# Patient Record
Sex: FEMALE | Race: WHITE | NOT HISPANIC OR LATINO | Employment: STUDENT | URBAN - METROPOLITAN AREA
[De-identification: names, ages, dates, MRNs, and addresses within clinical notes are randomized per-mention and may not be internally consistent; named-entity substitution may affect disease eponyms.]

---

## 2019-03-05 ENCOUNTER — TELEPHONE (OUTPATIENT)
Dept: GASTROENTEROLOGY | Facility: CLINIC | Age: 19
End: 2019-03-05

## 2019-03-05 ENCOUNTER — OFFICE VISIT (OUTPATIENT)
Dept: GASTROENTEROLOGY | Facility: CLINIC | Age: 19
End: 2019-03-05
Payer: COMMERCIAL

## 2019-03-05 VITALS
WEIGHT: 154 LBS | BODY MASS INDEX: 24.17 KG/M2 | SYSTOLIC BLOOD PRESSURE: 115 MMHG | DIASTOLIC BLOOD PRESSURE: 64 MMHG | HEART RATE: 80 BPM | HEIGHT: 67 IN

## 2019-03-05 DIAGNOSIS — K58.2 IRRITABLE BOWEL SYNDROME WITH CONSTIPATION AND DIARRHEA: ICD-10-CM

## 2019-03-05 DIAGNOSIS — R10.30 LOWER ABDOMINAL PAIN: ICD-10-CM

## 2019-03-05 DIAGNOSIS — Z12.11 COLON CANCER SCREENING: ICD-10-CM

## 2019-03-05 DIAGNOSIS — R19.4 CHANGE IN BOWEL HABIT: Primary | ICD-10-CM

## 2019-03-05 DIAGNOSIS — R19.7 DIARRHEA, UNSPECIFIED TYPE: ICD-10-CM

## 2019-03-05 PROCEDURE — 99214 OFFICE O/P EST MOD 30 MIN: CPT | Performed by: NURSE PRACTITIONER

## 2019-03-05 RX ORDER — LEVOCETIRIZINE DIHYDROCHLORIDE 5 MG/1
5 TABLET, FILM COATED ORAL EVERY EVENING
COMMUNITY

## 2019-03-05 RX ORDER — WHEAT DEXTRIN 3 G/3.8 G
POWDER (GRAM) ORAL DAILY
Refills: 0
Start: 2019-03-05

## 2019-03-05 NOTE — PROGRESS NOTES
[id number] - Outpatient Follow-up Note  Justyna Gross 20 y o  female MRN: [id number]  Encounter: [id number]    ASSESSMENT AND PLAN:      1  Change in bowel habit  Cannot exclude IBD, microscopic colitis, infectious colitis  May represent IBS with alternating diarrhea and constipation  Will obtain labs, stool studies and she will have a colonoscopy-[facility]  She will start a daily fiber supplement  She is reluctant to try hyoscyamine for her abdominal cramping as she had adverse effects with Bentyl  She will consider following completion of testing when she returns for follow-up  Probably would not use Questran as she has intermittent bouts of constipation/days without stool as well  - CBC and Platelet; Future  - Comprehensive metabolic panel; Future  - Sedimentation rate, automated; Future  - Celiac Disease Panel; Future  - TSH + Free T4; Future  - Sod Picosulfate-Mag Ox-Cit Acd (CLENPIQ) 10-3 5-12 MG-GM -GM/160ML SOLN; Use as directed  Dispense: 2 Bottle; Refill: 0    2  Diarrhea, unspecified type  Cannot exclude infectious etiology  May represent IBD, microscopic colitis or IBS  This may also represent post cholecystectomy chronic diarrhea  See change in bowel habits for considerations and plan      - H  pylori antigen, stool; Future  - Stool Enteric Bacterial Panel by PCR; Future  - White Blood Cells, Stool by Gram Stain; Future  - Lactoferrin, fecal, quantitative; Future  - Fecal fat, qualitative; Future  - FECAL LEUKOCYTES; Future  - Wheat Dextrin (BENEFIBER) POWD; Take by mouth daily; Refill: 0    3  Lower abdominal pain  Cannot exclude IBD or microscopic colitis but may represent IBS  See change in bowel habits for considerations and plan  4  Irritable bowel syndrome with constipation and diarrhea  Previously considered as the etiology of the patient's ongoing symptoms for the past 1 year  See change in bowel habits for considerations and plan    If all other possibilities and testing is negative, consider amitriptyline  5  Colon cancer screening  Average risk  Begin routine screenings at age 47  Due to her current symptoms she will have a colonoscopy now  Followup Appointment[de-identified]  2 months after she returns from her current semester at college   ______________________________________________________________________    Chief Complaint   Patient presents with    Abdominal Pain     off and on 1 year    Diarrhea     HPI:  This is a 48-year-old female patient who is accompanied to the office today with her father  She has had a 1 year history of diarrhea described as 6-10 watery explosive stools per day which come with urgency  The diarrhea occurs randomly and with no specific food triggers  The diarrhea started following her laparoscopic cholecystectomy in March 2018  She also experiences periods of a couple of days where she has no bowel movement, feels bloated and nauseated with fecal urgency  She describes this as constipation  Denies fecal incontinence, nocturnal stools  Denies melena, hematochezia but does note some mucus in her stools  There is lower abdominal cramping prior to bowel movements which is sometimes relieved following defecation  She reports fatigue, no muscle or joint aches  Denies nausea/vomiting, fever/chills  She has rare heartburn only after food indiscretions  Denies dysphagia, odynophagia, early satiety  No recent antibiotics, travel, sick contacts, although she does reside in a dorm environment at college  Her appetite is normal   Her weight has been stable  The patient is very tearful when describing her current health concerns      Historical Information   Past Medical History:   Diagnosis Date    Biliary dyskinesia     Migraines     Seasonal allergies      Past Surgical History:   Procedure Laterality Date    LAPAROSCOPIC CHOLECYSTECTOMY  03/2018    WISDOM TOOTH EXTRACTION       Social History     Substance and Sexual Activity   Alcohol Use Never    Frequency: Never     Social History     Substance and Sexual Activity   Drug Use Never     Social History     Tobacco Use   Smoking Status Never Smoker   Smokeless Tobacco Never Used     Family History   Problem Relation Age of Onset    No Known Problems Mother     Hypertension Father     Colon cancer Neg Hx     Colon polyps Neg Hx          Current Outpatient Medications:     levocetirizine (XYZAL) 5 MG tablet    Sod Picosulfate-Mag Ox-Cit Acd (CLENPIQ) 10-3 5-12 MG-GM -GM/160ML SOLN    Wheat Dextrin (BENEFIBER) POWD  Allergies   Allergen Reactions    Bentyl [Dicyclomine] Nausea Only       10 Point REVIEW OF SYSTEMS IS OTHERWISE NEGATIVE EXCEPT fatigue, decreased appetite, heartburn, change in bowel habits, difficulty sleeping and loss of appetite  Additional ROS per HPI  PHYSICAL EXAM:    Blood pressure 115/64, pulse 80, height 5' 6 5" (1 689 m), weight 69 9 kg (154 lb)  Body mass index is 24 48 kg/m²  General Appearance:  Alert, cooperative, no distress, tearful  HEENT:  Normocephalic, atraumatic, anicteric  Neck: Supple, symmetrical, trachea midline  Lungs: Clear to auscultation bilaterally; no rales, rhonchi or wheezing; respirations unlabored   Heart: Regular rate and rhythm; no murmur, rub, or gallop  Abdomen:  Soft, (+) lower abdominal tenderness with palpation, non-distended; normal bowel sounds; no masses, no organomegaly   Rectal:  Deferred   Extremities: No cyanosis, clubbing or edema   Skin: No jaundice, rashes, or lesions   Lymph nodes: No palpable cervical lymphadenopathy     Lab Results: The patient reports having previous labs done June, 2018 per her PCP  We will attempt to obtain these lab results for review      No results found for: WBC, HGB, HCT, MCV, PLT  No results found for: NA, K, CL, CO2, ANIONGAP, BUN, CREATININE, GLUCOSE, GLUF, CALCIUM, CORRECTEDCA, AST, ALT, ALKPHOS, PROT, BILITOT, EGFR  No results found for: IRON, TIBC, FERRITIN  No results found for: LIPASE    Radiology Results:   No results found

## 2019-06-11 ENCOUNTER — OFFICE VISIT (OUTPATIENT)
Dept: GASTROENTEROLOGY | Facility: CLINIC | Age: 19
End: 2019-06-11
Payer: COMMERCIAL

## 2019-06-11 VITALS
BODY MASS INDEX: 25.55 KG/M2 | WEIGHT: 159 LBS | DIASTOLIC BLOOD PRESSURE: 80 MMHG | SYSTOLIC BLOOD PRESSURE: 122 MMHG | HEIGHT: 66 IN | HEART RATE: 75 BPM

## 2019-06-11 DIAGNOSIS — R10.31 RIGHT LOWER QUADRANT ABDOMINAL PAIN: ICD-10-CM

## 2019-06-11 DIAGNOSIS — Z90.49 HISTORY OF CHOLECYSTECTOMY: ICD-10-CM

## 2019-06-11 DIAGNOSIS — R19.7 DIARRHEA, UNSPECIFIED TYPE: Primary | ICD-10-CM

## 2019-06-11 DIAGNOSIS — K58.0 IRRITABLE BOWEL SYNDROME WITH DIARRHEA: ICD-10-CM

## 2019-06-11 PROCEDURE — 99214 OFFICE O/P EST MOD 30 MIN: CPT | Performed by: INTERNAL MEDICINE

## 2019-06-11 RX ORDER — AMITRIPTYLINE HYDROCHLORIDE 10 MG/1
20 TABLET, FILM COATED ORAL
Qty: 60 TABLET | Refills: 5 | Status: SHIPPED | OUTPATIENT
Start: 2019-06-11 | End: 2019-12-06 | Stop reason: SDUPTHER

## 2019-06-11 RX ORDER — CHOLESTYRAMINE 4 G/9G
1 POWDER, FOR SUSPENSION ORAL
Qty: 30 PACKET | Refills: 5 | Status: SHIPPED | OUTPATIENT
Start: 2019-06-11 | End: 2019-12-03 | Stop reason: SDUPTHER

## 2019-06-13 LAB
ALBUMIN SERPL-MCNC: 4.4 G/DL (ref 3.5–5.5)
ALBUMIN/GLOB SERPL: 1.6 {RATIO} (ref 1.2–2.2)
ALP SERPL-CCNC: 55 IU/L (ref 39–117)
ALT SERPL-CCNC: 14 IU/L (ref 0–32)
AST SERPL-CCNC: 16 IU/L (ref 0–40)
BASOPHILS # BLD AUTO: 0.1 X10E3/UL (ref 0–0.2)
BASOPHILS NFR BLD AUTO: 1 %
BILIRUB SERPL-MCNC: 1.2 MG/DL (ref 0–1.2)
BUN SERPL-MCNC: 7 MG/DL (ref 6–20)
BUN/CREAT SERPL: 10 (ref 9–23)
CALCIUM SERPL-MCNC: 9.7 MG/DL (ref 8.7–10.2)
CHLORIDE SERPL-SCNC: 102 MMOL/L (ref 96–106)
CO2 SERPL-SCNC: 24 MMOL/L (ref 20–29)
CREAT SERPL-MCNC: 0.71 MG/DL (ref 0.57–1)
ENDOMYSIUM IGA SER QL: NEGATIVE
EOSINOPHIL # BLD AUTO: 0.3 X10E3/UL (ref 0–0.4)
EOSINOPHIL NFR BLD AUTO: 4 %
ERYTHROCYTE [DISTWIDTH] IN BLOOD BY AUTOMATED COUNT: 12.8 % (ref 12.3–15.4)
GLOBULIN SER-MCNC: 2.8 G/DL (ref 1.5–4.5)
GLUCOSE SERPL-MCNC: 82 MG/DL (ref 65–99)
HCT VFR BLD AUTO: 41.1 % (ref 34–46.6)
HGB BLD-MCNC: 13.9 G/DL (ref 11.1–15.9)
IGA SERPL-MCNC: 194 MG/DL (ref 87–352)
IMM GRANULOCYTES # BLD: 0 X10E3/UL (ref 0–0.1)
IMM GRANULOCYTES NFR BLD: 0 %
LYMPHOCYTES # BLD AUTO: 3.8 X10E3/UL (ref 0.7–3.1)
LYMPHOCYTES NFR BLD AUTO: 47 %
MCH RBC QN AUTO: 31 PG (ref 26.6–33)
MCHC RBC AUTO-ENTMCNC: 33.8 G/DL (ref 31.5–35.7)
MCV RBC AUTO: 92 FL (ref 79–97)
MONOCYTES # BLD AUTO: 0.7 X10E3/UL (ref 0.1–0.9)
MONOCYTES NFR BLD AUTO: 9 %
NEUTROPHILS # BLD AUTO: 3.1 X10E3/UL (ref 1.4–7)
NEUTROPHILS NFR BLD AUTO: 39 %
PLATELET # BLD AUTO: 240 X10E3/UL (ref 150–450)
POTASSIUM SERPL-SCNC: 4 MMOL/L (ref 3.5–5.2)
PROT SERPL-MCNC: 7.2 G/DL (ref 6–8.5)
RBC # BLD AUTO: 4.49 X10E6/UL (ref 3.77–5.28)
SL AMB EGFR AFRICAN AMERICAN: 142 ML/MIN/1.73
SL AMB EGFR NON AFRICAN AMERICAN: 123 ML/MIN/1.73
SODIUM SERPL-SCNC: 143 MMOL/L (ref 134–144)
TSH SERPL DL<=0.005 MIU/L-ACNC: 1.36 UIU/ML (ref 0.45–4.5)
TTG IGA SER-ACNC: <2 U/ML (ref 0–3)
WBC # BLD AUTO: 8 X10E3/UL (ref 3.4–10.8)

## 2019-07-26 ENCOUNTER — OFFICE VISIT (OUTPATIENT)
Dept: GASTROENTEROLOGY | Facility: CLINIC | Age: 19
End: 2019-07-26
Payer: COMMERCIAL

## 2019-07-26 VITALS
DIASTOLIC BLOOD PRESSURE: 66 MMHG | BODY MASS INDEX: 26.52 KG/M2 | HEART RATE: 93 BPM | HEIGHT: 66 IN | WEIGHT: 165 LBS | SYSTOLIC BLOOD PRESSURE: 114 MMHG

## 2019-07-26 DIAGNOSIS — Z90.49 HISTORY OF CHOLECYSTECTOMY: ICD-10-CM

## 2019-07-26 DIAGNOSIS — R19.7 DIARRHEA, UNSPECIFIED TYPE: Primary | ICD-10-CM

## 2019-07-26 DIAGNOSIS — R10.31 RIGHT LOWER QUADRANT ABDOMINAL PAIN: ICD-10-CM

## 2019-07-26 PROCEDURE — 99213 OFFICE O/P EST LOW 20 MIN: CPT | Performed by: INTERNAL MEDICINE

## 2019-07-26 NOTE — PROGRESS NOTES
[id number] - Outpatient Follow-up Note  Marko Never 20 y o  female MRN: [id number]  Encounter: [id number]    ASSESSMENT AND PLAN:      1  Diarrhea, unspecified type    Seems to have improved significantly since medication adjustment  The patient is on cholestyramine  If she takes it every day at 4 g she has some constipation  She can try taking half the dose 2 g or alternating days titrating to symptoms  -negative GI workup including colonoscopy and right colon biopsies, celiac panel was negative    2  History of cholecystectomy    -patient had a gallbladder out at age 22  Then began having diarrhea  Not having previous abdominal pain related to the gallbladder    3  Right lower quadrant abdominal pain    -this is resolved as well  She could have some IBS D  On amitriptyline 20 mg at bedtime  Could consider titrating down after this semester to 10 mg at bedtime then going off of it and see how she does  Followup Appointment:  Late December or early January  ______________________________________________________________________    Chief Complaint   Patient presents with    Follow-up     Diarrhea     HPI:  Patient returns to the office with her mother for follow-up visit with respect to her problems with diarrhea and right-sided abdominal pain  After her workup at last visit we placed her on cholestyramine 4 g daily in evening  She reports that this has helped a but she has some issues with constipation if she takes it every day  She has been skipping some days and this seems to be effective  She also does not have problems with abdominal pain and she had previously  She does not seem to be having any untoward side effects related to taking the medications      Historical Information   Past Medical History:   Diagnosis Date    Biliary dyskinesia     IBS (irritable bowel syndrome)     Migraines     Seasonal allergies      Past Surgical History: Procedure Laterality Date    LAPAROSCOPIC CHOLECYSTECTOMY  03/2018    WISDOM TOOTH EXTRACTION       Social History     Substance and Sexual Activity   Alcohol Use Never    Frequency: Never     Social History     Substance and Sexual Activity   Drug Use Never     Social History     Tobacco Use   Smoking Status Never Smoker   Smokeless Tobacco Never Used     Family History   Problem Relation Age of Onset    No Known Problems Mother     Hypertension Father     Colon cancer Neg Hx     Colon polyps Neg Hx          Current Outpatient Medications:     levocetirizine (XYZAL) 5 MG tablet    amitriptyline (ELAVIL) 10 mg tablet    cholestyramine (QUESTRAN) 4 g packet    Sod Picosulfate-Mag Ox-Cit Acd (CLENPIQ) 10-3 5-12 MG-GM -GM/160ML SOLN    Wheat Dextrin (BENEFIBER) POWD  Allergies   Allergen Reactions    Bentyl [Dicyclomine] Nausea Only       10 Point REVIEW OF SYSTEMS IS OTHERWISE NEGATIVE  PHYSICAL EXAM:    Blood pressure 114/66, pulse 93, height 5' 6" (1 676 m), weight 74 8 kg (165 lb)  Body mass index is 26 63 kg/m²  General Appearance:  Alert, cooperative, no distress  HEENT:  Normocephalic, atraumatic, anicteric  Neck: Supple, symmetrical, trachea midline  Lungs: Clear to auscultation bilaterally; no rales, rhonchi or wheezing; respirations unlabored   Heart: Regular rate and rhythm; no murmur, rub, or gallop    Abdomen:   Soft, non-tender, non-distended; normal bowel sounds; no masses, no organomegaly   Rectal:  Deferred   Extremities:  No cyanosis, clubbing or edema   Skin:  No jaundice, rashes, or lesions   Lymph nodes: No palpable cervical lymphadenopathy     Lab Results:   Lab Results   Component Value Date    WBC 8 0 06/12/2019    HGB 13 9 06/12/2019    HCT 41 1 06/12/2019    MCV 92 06/12/2019     06/12/2019     Lab Results   Component Value Date    K 4 0 06/12/2019     06/12/2019    CO2 24 06/12/2019    BUN 7 06/12/2019    CREATININE 0 71 06/12/2019    AST 16 06/12/2019    ALT 14 06/12/2019

## 2019-07-26 NOTE — LETTER
July 26, 2019     Roge Don MD  [address]  [address]  [facility] [address]    Patient: Alisa Dancer   YOB: 2000   Date of Visit: 7/26/2019       Dear Dr Cassie Zazueta: Thank you for referring Alisa Dancer to me for evaluation  Below are my notes for this consultation  If you have questions, please do not hesitate to call me  I look forward to following your patient along with you  Sincerely,        Marya Gentile MD        CC: No Recipients  Marya Gentile MD  7/26/2019  1:52 PM  [facility] - Outpatient Follow-up Note  Alisa Dancer 20 y o  female MRN: [id number]  Encounter: [id number]    ASSESSMENT AND PLAN:      1  Diarrhea, unspecified type    Seems to have improved significantly since medication adjustment  The patient is on cholestyramine  If she takes it every day at 4 g she has some constipation  She can try taking half the dose 2 g or alternating days titrating to symptoms  -negative GI workup including colonoscopy and right colon biopsies, celiac panel was negative    2  History of cholecystectomy    -patient had a gallbladder out at age 22  Then began having diarrhea  Not having previous abdominal pain related to the gallbladder    3  Right lower quadrant abdominal pain    -this is resolved as well  She could have some IBS D  On amitriptyline 20 mg at bedtime  Could consider titrating down after this semester to 10 mg at bedtime then going off of it and see how she does  Followup Appointment:  Late December or early January  ______________________________________________________________________    Chief Complaint   Patient presents with    Follow-up     Diarrhea     HPI:  Patient returns to the office with her mother for follow-up visit with respect to her problems with diarrhea and right-sided abdominal pain  After her workup at last visit we placed her on cholestyramine 4 g daily in evening  She reports that this has helped a but she has some issues with constipation if she takes it every day  She has been skipping some days and this seems to be effective  She also does not have problems with abdominal pain and she had previously  She does not seem to be having any untoward side effects related to taking the medications  Historical Information   Past Medical History:   Diagnosis Date    Biliary dyskinesia     IBS (irritable bowel syndrome)     Migraines     Seasonal allergies      Past Surgical History:   Procedure Laterality Date    LAPAROSCOPIC CHOLECYSTECTOMY  03/2018    WISDOM TOOTH EXTRACTION       Social History     Substance and Sexual Activity   Alcohol Use Never    Frequency: Never     Social History     Substance and Sexual Activity   Drug Use Never     Social History     Tobacco Use   Smoking Status Never Smoker   Smokeless Tobacco Never Used     Family History   Problem Relation Age of Onset    No Known Problems Mother     Hypertension Father     Colon cancer Neg Hx     Colon polyps Neg Hx          Current Outpatient Medications:     levocetirizine (XYZAL) 5 MG tablet    amitriptyline (ELAVIL) 10 mg tablet    cholestyramine (QUESTRAN) 4 g packet    Sod Picosulfate-Mag Ox-Cit Acd (CLENPIQ) 10-3 5-12 MG-GM -GM/160ML SOLN    Wheat Dextrin (BENEFIBER) POWD  Allergies   Allergen Reactions    Bentyl [Dicyclomine] Nausea Only       10 Point REVIEW OF SYSTEMS IS OTHERWISE NEGATIVE  PHYSICAL EXAM:    Blood pressure 114/66, pulse 93, height 5' 6" (1 676 m), weight 74 8 kg (165 lb)  Body mass index is 26 63 kg/m²  General Appearance:  Alert, cooperative, no distress  HEENT:  Normocephalic, atraumatic, anicteric  Neck: Supple, symmetrical, trachea midline  Lungs: Clear to auscultation bilaterally; no rales, rhonchi or wheezing; respirations unlabored   Heart: Regular rate and rhythm; no murmur, rub, or gallop    Abdomen:   Soft, non-tender, non-distended; normal bowel sounds; no masses, no organomegaly   Rectal:  Deferred   Extremities:  No cyanosis, clubbing or edema   Skin:  No jaundice, rashes, or lesions   Lymph nodes: No palpable cervical lymphadenopathy     Lab Results:   Lab Results   Component Value Date    WBC 8 0 06/12/2019    HGB 13 9 06/12/2019    HCT 41 1 06/12/2019    MCV 92 06/12/2019     06/12/2019     Lab Results   Component Value Date    K 4 0 06/12/2019     06/12/2019    CO2 24 06/12/2019    BUN 7 06/12/2019    CREATININE 0 71 06/12/2019    AST 16 06/12/2019    ALT 14 06/12/2019

## 2019-07-26 NOTE — PATIENT INSTRUCTIONS
[facility] - Outpatient Follow-up Note  Darien Peck 20 y o  female MRN: [id number]  Encounter: [id number]    ASSESSMENT AND PLAN:      1  Diarrhea, unspecified type    Seems to have improved significantly since medication adjustment  The patient is on cholestyramine  If she takes it every day at 4 g she has some constipation  She can try taking half the dose 2 g or alternating days titrating to symptoms  -negative GI workup including colonoscopy and right colon biopsies, celiac panel was negative    2  History of cholecystectomy    -patient had a gallbladder out at age 22  Then began having diarrhea  Not having previous abdominal pain related to the gallbladder    3  Right lower quadrant abdominal pain    -this is resolved as well  She could have some IBS D  On amitriptyline 20 mg at bedtime  Could consider titrating down after this semester to 10 mg at bedtime then going off of it and see how she does        Followup Appointment:  Late December or early January

## 2019-12-03 DIAGNOSIS — R19.7 DIARRHEA, UNSPECIFIED TYPE: ICD-10-CM

## 2019-12-03 RX ORDER — CHOLESTYRAMINE 4 G/9G
1 POWDER, FOR SUSPENSION ORAL
Qty: 30 PACKET | Refills: 2 | Status: SHIPPED | OUTPATIENT
Start: 2019-12-03 | End: 2021-01-19

## 2019-12-06 DIAGNOSIS — K58.0 IRRITABLE BOWEL SYNDROME WITH DIARRHEA: ICD-10-CM

## 2019-12-06 RX ORDER — AMITRIPTYLINE HYDROCHLORIDE 10 MG/1
20 TABLET, FILM COATED ORAL
Qty: 60 TABLET | Refills: 2 | Status: SHIPPED | OUTPATIENT
Start: 2019-12-06 | End: 2020-01-02 | Stop reason: SDUPTHER

## 2019-12-17 LAB — HBA1C MFR BLD HPLC: 5 %

## 2020-01-02 ENCOUNTER — OFFICE VISIT (OUTPATIENT)
Dept: GASTROENTEROLOGY | Facility: CLINIC | Age: 20
End: 2020-01-02
Payer: COMMERCIAL

## 2020-01-02 VITALS
HEART RATE: 104 BPM | DIASTOLIC BLOOD PRESSURE: 92 MMHG | HEIGHT: 66 IN | WEIGHT: 167 LBS | BODY MASS INDEX: 26.84 KG/M2 | SYSTOLIC BLOOD PRESSURE: 140 MMHG

## 2020-01-02 DIAGNOSIS — R79.89 ELEVATED LFTS: ICD-10-CM

## 2020-01-02 DIAGNOSIS — R19.7 DIARRHEA, UNSPECIFIED TYPE: Primary | ICD-10-CM

## 2020-01-02 DIAGNOSIS — R10.30 LOWER ABDOMINAL PAIN: ICD-10-CM

## 2020-01-02 PROCEDURE — 99214 OFFICE O/P EST MOD 30 MIN: CPT | Performed by: INTERNAL MEDICINE

## 2020-01-02 RX ORDER — AMITRIPTYLINE HYDROCHLORIDE 10 MG/1
20 TABLET, FILM COATED ORAL
Qty: 180 TABLET | Refills: 1 | Status: SHIPPED | OUTPATIENT
Start: 2020-01-02 | End: 2020-06-29

## 2020-01-02 NOTE — PROGRESS NOTES
[facility] - Outpatient Follow-up Note  Afsaneh Wright 20 y o  female MRN: [id number]  Encounter: [id number]    ASSESSMENT AND PLAN:      1  Lower abdominal pain  Still continues with intermittent lower abdominal pain, she reports that this has mildly improved since she was seen in the summer 2019  She is still taking Amitriptyline 20 mg daily which was helping with pain but for the past 2-3 months she has noticed that it has been less effective  Pt had negative colonoscopy in March 2019, celiac panel negative  Symptoms consistent with IBS-D   -continue Amitriptyline 20 mg daily   -will trial Levsin q6hrs PRN for pain as pt was unable to tolerate Bentyl in the past   -rule out GYN pathology, will order pelvic US     2  Diarrhea, unspecified type  Likely post-choleretic diarrhea  Improved since starting Questran  Pt will only experience episodes of diarrhea when she does not take Questran daily    -continue Questran 4g daily     3  Elevated LFT's   Reviewed labs from 12/17  AST 58,   Consistent with hepatocellular injury pattern  Absolute lymphocytes 4464   Possibly could be in the setting of viral syndrome  She has not started any new medications  -PCP Dr Lalit Bales ordered repeat labs, pt is scheduled to have these tests completed next week before she goes back to school  Asked patient to send copy to our office  Followup Appointment: 5 months   ______________________________________________________________________    Chief Complaint   Patient presents with    Follow-up     diarrhea     HPI:  Afsaneh Wright is a 20y o  year old female who presents to the office today for a follow up appointment  Pt is still having episodes of intermittent lower abdominal pain  She is still taking Amitriptyline 20 mg daily which was helping with pain but for the past 2-3 months she has noticed that it has been less effective   She states that pain occurs most every day and is described as a dull ache, she reports that pain is located in both sides of her lower abdomen  Pain improves after having a bowel movement  She denies any continued issues with diarrhea since starting Questran  She denies any fever/chills, vomiting, urinary symptoms, melena, hematochezia  Associated with nausea  Pt is currently not sexually active, she did have pelvic US prior to cholecystectomy  Of note, a few weeks ago, pt saw PCP for possible viral syndrome  Reported a few episodes of vomiting and abdominal pain  She had CMP and CBC ordered by PCP on 12/17 which showed elevated AST and ALT and elevated lymphocytes on CBC  She denies any fever/chills, sore throat, nasal congestion, or other URI symptoms, diarrhea  Historical Information   Past Medical History:   Diagnosis Date    Biliary dyskinesia     IBS (irritable bowel syndrome)     Migraines     Seasonal allergies      Past Surgical History:   Procedure Laterality Date    COLONOSCOPY  03/08/2019    LAPAROSCOPIC CHOLECYSTECTOMY  03/2018    WISDOM TOOTH EXTRACTION       Social History     Substance and Sexual Activity   Alcohol Use Never    Frequency: Never     Social History     Substance and Sexual Activity   Drug Use Never     Social History     Tobacco Use   Smoking Status Never Smoker   Smokeless Tobacco Never Used     Family History   Problem Relation Age of Onset    No Known Problems Mother     Hypertension Father     Colon cancer Neg Hx     Colon polyps Neg Hx     GI problems Neg Hx          Current Outpatient Medications:     amitriptyline (ELAVIL) 10 mg tablet    cholestyramine (QUESTRAN) 4 g packet    levocetirizine (XYZAL) 5 MG tablet    Wheat Dextrin (BENEFIBER) POWD  Allergies   Allergen Reactions    Bentyl [Dicyclomine] Nausea Only     Reviewed medications and allergies and updated as indicated    PHYSICAL EXAM:    Blood pressure 140/92, pulse 104, height 5' 6" (1 676 m), weight 75 8 kg (167 lb)   Body mass index is 26 95 kg/m²  General Appearance: NAD, cooperative, alert  Eyes: Anicteric, PERRLA, EOMI  ENT:  Normocephalic, atraumatic, normal mucosa  Neck:  Supple, symmetrical, trachea midline  Resp:  Clear to auscultation bilaterally; no rales, rhonchi or wheezing; respirations unlabored   CV:  S1 S2, Regular rate and rhythm; no murmur, rub, or gallop  GI:  Soft, non-tender, non-distended; normal bowel sounds; no masses, no organomegaly   Rectal: Deferred  Musculoskeletal: No cyanosis, clubbing or edema  Normal ROM  Skin:  No jaundice, rashes, or lesions   Heme/Lymph: No palpable cervical lymphadenopathy  Psych: Normal affect, good eye contact  Neuro: No gross deficits, AAOx3    Lab Results:   Lab Results   Component Value Date    WBC 8 0 06/12/2019    HGB 13 9 06/12/2019    HCT 41 1 06/12/2019    MCV 92 06/12/2019     06/12/2019     Lab Results   Component Value Date    K 4 0 06/12/2019     06/12/2019    CO2 24 06/12/2019    BUN 7 06/12/2019    CREATININE 0 71 06/12/2019    AST 16 06/12/2019    ALT 14 06/12/2019     No results found for: IRON, TIBC, FERRITIN  No results found for: LIPASE    Radiology Results:   No results found

## 2020-01-02 NOTE — PATIENT INSTRUCTIONS
Continue to take Amitriptyline 20 mg daily   Start taking Levsin every 6 hours as needed for pain   Continue Questran daily   Follow up in late May 2020/June 2020   Call office sooner if symptoms worsen

## 2020-01-03 ENCOUNTER — TELEPHONE (OUTPATIENT)
Dept: GASTROENTEROLOGY | Facility: CLINIC | Age: 20
End: 2020-01-03

## 2020-01-03 DIAGNOSIS — R10.30 LOWER ABDOMINAL PAIN: Primary | ICD-10-CM

## 2020-01-03 NOTE — TELEPHONE ENCOUNTER
Pt returning call to Rod[facility]guez Kirk'd note   Pt prefers to go to [address] [place] [address] need testing done next wk before she returns to college 1/12; asks for [phone] to let her know when order sent so she can schedule

## 2020-01-03 NOTE — TELEPHONE ENCOUNTER
Called and left voicemail for patient  Would like to check pelvic US to r/o GYN pathology  Asked pt to call back and see where she will go to get test done

## 2020-01-03 NOTE — TELEPHONE ENCOUNTER
Order placed and copy faxed to [facility] patient registration  Patient notified of receipt of confirmation of fax from [facility] and she will call to schedule

## 2020-01-07 ENCOUNTER — TELEPHONE (OUTPATIENT)
Dept: GASTROENTEROLOGY | Facility: CLINIC | Age: 20
End: 2020-01-07

## 2020-01-07 NOTE — TELEPHONE ENCOUNTER
Called and spoke with pt  Over the weekend she began to develop RLQ abdominal pain that is now constant and is radiating to back, rates 5-7/10  Was seen in office last week and pain was intermittent and on both sides of lower abdomen, this pain she states is different  She denies fever/chills, vomiting, diarrhea  She is c/o nausea  Reviewed pelvic US which was negative  Recommended that pt go to [facility] ER for evaluation for CT A/P to rule out appendicitis

## 2020-01-07 NOTE — TELEPHONE ENCOUNTER
Patient called  Has pain in RLQ radiating to her back  US showed ovarian cyst   Pain is fairly constant, 5-7 on pain scale  No vomiting  Normal bowels  Some nausea  No fever  She is concerned it is her appendicitis    Has not started new meds yet and is wondering if she should try that first?  Call back # is [phone]

## 2020-01-15 ENCOUNTER — TELEPHONE (OUTPATIENT)
Dept: GASTROENTEROLOGY | Facility: CLINIC | Age: 20
End: 2020-01-15

## 2020-01-15 NOTE — TELEPHONE ENCOUNTER
Called and spoke with patient  Reviewed CT abdomen pelvis from ED visit at [facility] on 01/07/2020  Negative for acute pathology reviewed LFTs which had previously been elevated, all LFTs are now normal   Patient reports that she is feeling better  She still has intermittent episodes of pain but reports that this is improved since starting Levsin PRN  Encouraged her to continue this and follow up as scheduled or sooner if problems arise  Patient verbalized understanding

## 2020-06-02 ENCOUNTER — TELEMEDICINE (OUTPATIENT)
Dept: GASTROENTEROLOGY | Facility: CLINIC | Age: 20
End: 2020-06-02
Payer: COMMERCIAL

## 2020-06-02 VITALS — HEIGHT: 66 IN | WEIGHT: 165 LBS | BODY MASS INDEX: 26.52 KG/M2

## 2020-06-02 DIAGNOSIS — K58.0 IRRITABLE BOWEL SYNDROME WITH DIARRHEA: Primary | ICD-10-CM

## 2020-06-02 PROCEDURE — 99212 OFFICE O/P EST SF 10 MIN: CPT | Performed by: NURSE PRACTITIONER

## 2020-06-27 DIAGNOSIS — R10.30 LOWER ABDOMINAL PAIN: ICD-10-CM

## 2020-06-29 RX ORDER — AMITRIPTYLINE HYDROCHLORIDE 10 MG/1
TABLET, FILM COATED ORAL
Qty: 180 TABLET | Refills: 1 | Status: SHIPPED | OUTPATIENT
Start: 2020-06-29 | End: 2020-12-15

## 2020-12-15 DIAGNOSIS — R10.30 LOWER ABDOMINAL PAIN: ICD-10-CM

## 2020-12-15 RX ORDER — AMITRIPTYLINE HYDROCHLORIDE 10 MG/1
TABLET, FILM COATED ORAL
Qty: 180 TABLET | Refills: 1 | Status: SHIPPED | OUTPATIENT
Start: 2020-12-15 | End: 2021-07-06

## 2020-12-15 NOTE — TELEPHONE ENCOUNTER
Can we please contact patient for a follow up? She was recommended to have a 6 month follow up  Thanks

## 2021-01-19 ENCOUNTER — TELEMEDICINE (OUTPATIENT)
Dept: GASTROENTEROLOGY | Facility: CLINIC | Age: 21
End: 2021-01-19
Payer: COMMERCIAL

## 2021-01-19 VITALS — WEIGHT: 164 LBS | BODY MASS INDEX: 26.36 KG/M2 | HEIGHT: 66 IN

## 2021-01-19 DIAGNOSIS — R10.30 LOWER ABDOMINAL PAIN: ICD-10-CM

## 2021-01-19 DIAGNOSIS — R12 HEARTBURN: ICD-10-CM

## 2021-01-19 DIAGNOSIS — K58.0 IRRITABLE BOWEL SYNDROME WITH DIARRHEA: Primary | ICD-10-CM

## 2021-01-19 PROCEDURE — 99213 OFFICE O/P EST LOW 20 MIN: CPT | Performed by: NURSE PRACTITIONER

## 2021-01-19 RX ORDER — SPIRONOLACTONE 50 MG/1
50 TABLET, FILM COATED ORAL DAILY
COMMUNITY

## 2021-01-19 RX ORDER — MAGNESIUM 30 MG
30 TABLET ORAL DAILY
COMMUNITY

## 2021-01-19 RX ORDER — ZINC GLUCONATE 50 MG
50 TABLET ORAL DAILY
COMMUNITY

## 2021-01-19 NOTE — PROGRESS NOTES
Virtual Regular Visit      Assessment/Plan:    1  Irritable bowel syndrome with diarrhea   Symptoms have been well controlled since last office visit in June 2020  She does experience intermittent episodes of diarrhea and lower abdominal pain but this has been less frequent  Previous colonoscopy in March 2019 showed normal mucosa throughout the whole colon, biopsies were negative for colitis, dysplasia  Celiac panel, TSH in 2019 were normal   Pelvic ultrasound and CT abdomen and pelvis in 2020 were also negative  Symptoms consistent with IBS D      - continue Amitriptyline 20 mg daily at bedtime  - continue Levsin as needed for pain and cramping  - continue Questran as needed  - increase fluid intake  - high-fiber diet  - continue daily fiber supplement    2  Heartburn   Patient does have infrequent episodes of heartburn which she attributes to dietary indiscretions such as spicy and acidic foods     - GERD lifestyle modifications discussed with patient  - avoid food triggers if able to    Follow up: 6 months     Problem List Items Addressed This Visit        Digestive    Irritable bowel syndrome with diarrhea - Primary      Other Visit Diagnoses     Heartburn                 Reason for visit is follow-up IBS  Chief Complaint   Patient presents with    Follow up-Diarrhea, feeling good    Virtual Regular Visit        Encounter provider ARLINE Cortes    Provider located at [address] [address] [phone]      Recent Visits  No visits were found meeting these conditions  Showing recent visits within past 7 days and meeting all other requirements     Today's Visits  Date Type Provider Dept   01/19/21 Telemedicine ARLINE Cortes [facility]   Showing today's visits and meeting all other requirements     Future Appointments  No visits were found meeting these conditions     Showing future appointments within next 150 days and meeting all other requirements        The patient was identified by name and date of birth  Samantha Torres was informed that this is a telemedicine visit and that the visit is being conducted through [facility] and patient was informed that this is not a secure, HIPAA-compliant platform  She agrees to proceed     My office door was closed  No one else was in the room  She acknowledged consent and understanding of privacy and security of the video platform  The patient has agreed to participate and understands they can discontinue the visit at any time  Patient is aware this is a billable service  Ether Barrier is a 23 y o  female who was seen for virtual visit  She was last seen in the office in June 2020  She has a history of IBS and reports that her symptoms have been well controlled  She reports that she has a soft and formed bowel movement daily  She does note that she will have occasional alternating episodes of constipation and diarrhea that occurs around her menstrual cycle  She will have infrequent episodes of diarrhea/loose stools and abdominal cramping at times of increased stress or after eating certain foods  She also has episodic heartburn that occurs with eating spicy and acidic foods  She denies any fever, chills, reflux, dysphagia, odynophagia, early satiety, nausea, vomiting, hematochezia or melena  Her appetite is stable  She does report approximately 10 lb weight loss in the past 6 months but she attributes this to being more active  She did also have COVID-19 in November 2020 but reports that her symptoms were mild and she is feeling better        Past Medical History:   Diagnosis Date    Biliary dyskinesia     IBS (irritable bowel syndrome)     Migraines     Seasonal allergies        Past Surgical History:   Procedure Laterality Date    COLONOSCOPY  03/08/2019    LAPAROSCOPIC CHOLECYSTECTOMY  03/2018    WISDOM TOOTH EXTRACTION         Current Outpatient Medications   Medication Sig Dispense Refill    amitriptyline (ELAVIL) 10 mg tablet TAKE 2 TABLETS BY MOUTH EVERY DAY AT BEDTIME 180 tablet 1    cholestyramine (QUESTRAN) 4 g packet TAKE 1 PACKET (4 G TOTAL) BY MOUTH DAILY AT 2AM FOR 30 DAYS (Patient taking differently: Take 1 packet by mouth as needed ) 30 packet 2    hyoscyamine (LEVSIN/SL) 0 125 mg SL tablet Take 1 tablet (0 125 mg total) by mouth every 6 (six) hours as needed for cramping 30 tablet 4    levocetirizine (XYZAL) 5 MG tablet Take 5 mg by mouth every evening      magnesium 30 MG tablet Take 30 mg by mouth daily      spironolactone (ALDACTONE) 50 mg tablet Take 50 mg by mouth daily      Wheat Dextrin (BENEFIBER) POWD Take by mouth daily (Patient taking differently: Take by mouth as needed )  0    zinc gluconate 50 mg tablet Take 50 mg by mouth daily       No current facility-administered medications for this visit           Allergies   Allergen Reactions    Bentyl [Dicyclomine] Nausea Only     Family History   Problem Relation Age of Onset    No Known Problems Mother     Hypertension Father     Colon cancer Neg Hx     Colon polyps Neg Hx     GI problems Neg Hx      Social History     Socioeconomic History    Marital status: Single     Spouse name: None    Number of children: None    Years of education: None    Highest education level: None   Occupational History    None   Social Needs    Financial resource strain: None    Food insecurity     Worry: None     Inability: None    Transportation needs     Medical: None     Non-medical: None   Tobacco Use    Smoking status: Never Smoker    Smokeless tobacco: Never Used   Substance and Sexual Activity    Alcohol use: Never     Frequency: Never    Drug use: Never    Sexual activity: Not Currently   Lifestyle    Physical activity     Days per week: None     Minutes per session: None    Stress: None   Relationships    Social connections     Talks on phone: None     Gets together: None     Attends Hoahaoism service: None     Active member of club or organization: None     Attends meetings of clubs or organizations: None     Relationship status: None    Intimate partner violence     Fear of current or ex partner: None     Emotionally abused: None     Physically abused: None     Forced sexual activity: None   Other Topics Concern    None   Social History Narrative    None         Review of Systems  REVIEW OF SYSTEMS:    CONSTITUTIONAL: Denies any fever, chills, rigors, and weight loss  HEENT: No earache or tinnitus  Denies hearing loss or visual disturbances  CARDIOVASCULAR: No chest pain or palpitations  RESPIRATORY: Denies any cough, hemoptysis, shortness of breath or dyspnea on exertion  GASTROINTESTINAL: As noted in the History of Present Illness  GENITOURINARY: No problems with urination  Denies any hematuria or dysuria  NEUROLOGIC: No dizziness or vertigo, denies headaches  MUSCULOSKELETAL: Denies any muscle or joint pain  SKIN: Denies skin rashes or itching  ENDOCRINE: Denies excessive thirst  Denies intolerance to heat or cold  PSYCHOSOCIAL: Denies depression or anxiety  Denies any recent memory loss  Video Exam    Vitals:    01/19/21 0834   Weight: 74 4 kg (164 lb)   Height: 5' 6" (1 676 m)       Physical Exam   General: appears well, no acute distress   HENT: Normocephalic, atraumatic, anicteric   Neck: supple neck, symmetrical   Lungs: Equal chest rise and unlabored breathing   Abdomen: no abdominal tenderness to palpation per pt   Neuro: AAOx3, follow commands   Psych: cooperative, normal affect   Skin: No jaundice, rashes, or lesions         I spent 25 minutes directly with the patient during this visit      VIRTUAL VISIT DISCLAIMER    Dana Marrvanessa acknowledges that she has consented to an online visit or consultation   She understands that the online visit is based solely on information provided by her, and that, in the absence of a face-to-face physical evaluation by the physician, the diagnosis she receives is both limited and provisional in terms of accuracy and completeness  This is not intended to replace a full medical face-to-face evaluation by the physician  Kelly Young understands and accepts these terms

## 2021-07-01 DIAGNOSIS — R10.30 LOWER ABDOMINAL PAIN: ICD-10-CM

## 2021-07-02 NOTE — TELEPHONE ENCOUNTER
Last OV 1/19/2021  KW/TERESA  Recommended F/U Six months (July 2021)  Last refill     12/15/2020    Recall in place for appt  I also called patient and left message to call to schedule appt

## 2021-07-06 RX ORDER — AMITRIPTYLINE HYDROCHLORIDE 10 MG/1
TABLET, FILM COATED ORAL
Qty: 180 TABLET | Refills: 1 | Status: SHIPPED | OUTPATIENT
Start: 2021-07-06